# Patient Record
Sex: FEMALE | Race: OTHER | NOT HISPANIC OR LATINO | Employment: STUDENT | URBAN - METROPOLITAN AREA
[De-identification: names, ages, dates, MRNs, and addresses within clinical notes are randomized per-mention and may not be internally consistent; named-entity substitution may affect disease eponyms.]

---

## 2023-09-30 ENCOUNTER — PHARMACY VISIT (OUTPATIENT)
Dept: PHARMACY | Facility: CLINIC | Age: 23
End: 2023-09-30
Payer: COMMERCIAL

## 2023-09-30 PROCEDURE — RXMED WILLOW AMBULATORY MEDICATION CHARGE

## 2023-10-02 ENCOUNTER — PHARMACY VISIT (OUTPATIENT)
Dept: PHARMACY | Facility: CLINIC | Age: 23
End: 2023-10-02
Payer: COMMERCIAL

## 2023-10-02 PROCEDURE — RXMED WILLOW AMBULATORY MEDICATION CHARGE

## 2023-10-02 RX ORDER — DOXYCYCLINE HYCLATE 100 MG
TABLET ORAL
Qty: 20 TABLET | Refills: 0 | OUTPATIENT
Start: 2023-10-02 | End: 2023-11-07 | Stop reason: SDUPTHER

## 2023-10-05 ENCOUNTER — PHARMACY VISIT (OUTPATIENT)
Dept: PHARMACY | Facility: CLINIC | Age: 23
End: 2023-10-05
Payer: COMMERCIAL

## 2023-10-05 PROCEDURE — RXMED WILLOW AMBULATORY MEDICATION CHARGE

## 2023-10-05 RX ORDER — FLUCONAZOLE 150 MG/1
TABLET ORAL
Qty: 1 TABLET | Refills: 2 | OUTPATIENT
Start: 2023-10-05 | End: 2023-11-30 | Stop reason: ALTCHOICE

## 2023-11-07 ENCOUNTER — PHARMACY VISIT (OUTPATIENT)
Dept: PHARMACY | Facility: CLINIC | Age: 23
End: 2023-11-07
Payer: COMMERCIAL

## 2023-11-07 PROCEDURE — RXMED WILLOW AMBULATORY MEDICATION CHARGE

## 2023-11-07 RX ORDER — DOXYCYCLINE HYCLATE 100 MG
TABLET ORAL
Qty: 14 TABLET | Refills: 0 | OUTPATIENT
Start: 2023-11-07 | End: 2023-11-30 | Stop reason: ALTCHOICE

## 2023-11-30 ENCOUNTER — OFFICE VISIT (OUTPATIENT)
Dept: OTOLARYNGOLOGY | Facility: HOSPITAL | Age: 23
End: 2023-11-30
Payer: COMMERCIAL

## 2023-11-30 ENCOUNTER — PHARMACY VISIT (OUTPATIENT)
Dept: PHARMACY | Facility: CLINIC | Age: 23
End: 2023-11-30
Payer: COMMERCIAL

## 2023-11-30 VITALS — WEIGHT: 104.6 LBS | HEIGHT: 66 IN | TEMPERATURE: 97.2 F | BODY MASS INDEX: 16.81 KG/M2

## 2023-11-30 DIAGNOSIS — R09.81 NASAL CONGESTION: ICD-10-CM

## 2023-11-30 PROCEDURE — RXMED WILLOW AMBULATORY MEDICATION CHARGE

## 2023-11-30 PROCEDURE — 99213 OFFICE O/P EST LOW 20 MIN: CPT | Performed by: STUDENT IN AN ORGANIZED HEALTH CARE EDUCATION/TRAINING PROGRAM

## 2023-11-30 PROCEDURE — 1036F TOBACCO NON-USER: CPT | Performed by: STUDENT IN AN ORGANIZED HEALTH CARE EDUCATION/TRAINING PROGRAM

## 2023-11-30 PROCEDURE — 99203 OFFICE O/P NEW LOW 30 MIN: CPT | Performed by: STUDENT IN AN ORGANIZED HEALTH CARE EDUCATION/TRAINING PROGRAM

## 2023-11-30 RX ORDER — FLUTICASONE PROPIONATE 50 MCG
2 SPRAY, SUSPENSION (ML) NASAL NIGHTLY
Qty: 16 G | Refills: 11 | Status: SHIPPED | OUTPATIENT
Start: 2023-11-30 | End: 2024-11-29

## 2023-11-30 ASSESSMENT — PATIENT HEALTH QUESTIONNAIRE - PHQ9
2. FEELING DOWN, DEPRESSED OR HOPELESS: NOT AT ALL
1. LITTLE INTEREST OR PLEASURE IN DOING THINGS: NOT AT ALL
SUM OF ALL RESPONSES TO PHQ9 QUESTIONS 1 & 2: 0

## 2023-11-30 NOTE — PROGRESS NOTES
Chief Complaint:    Chief Complaint   Patient presents with    Follow-up     Chronic sinus infections. After Covid - tonsil swollen, recurrent right ear infections, coughing and running nose. RSV treated follow bronchitis.       History of Present Illness:  23 y.o. female presents to [facility] on 11/30/23 for evaluation of her tonsils and sinuses.  The patient is a [employer] student, but is she is originally from [place].  She reports multiple episodes of ear infections, sinus infections, and tonsil infections ever since she came to college.  She has had 2 bad infections in the last several years.  1 which required hospitalization at [place] for what sounds like a respiratory infection.  The patient recently got COVID in September leading to tonsil issues and subsequent lung infection.  She has been on multiple courses of antibiotics.  She was told to follow-up with immunology as well as ENT as an outpatient.  She denies a previous history of frequent strep throat or tonsil infections prior to this.  She does note current congestion issues but no history of frequent sinusitis.  She denies history of strep throat infections.  The patient denies smoking and alcohol use.    Past Medical History  No past medical history on file.    Past Surgical History  No past surgical history on file.    Social History  Social History     Socioeconomic History    Marital status: Single     Spouse name: Not on file    Number of children: Not on file    Years of education: Not on file    Highest education level: Not on file   Occupational History    Not on file   Tobacco Use    Smoking status: Never    Smokeless tobacco: Never   Substance and Sexual Activity    Alcohol use: Not on file    Drug use: Not on file    Sexual activity: Not on file   Other Topics Concern    Not on file   Social History Narrative    Not on file     Social Determinants of Health     Financial Resource  "Strain: Not on file   Food Insecurity: Not on file   Transportation Needs: Not on file   Physical Activity: Not on file   Stress: Not on file   Social Connections: Not on file   Intimate Partner Violence: Not on file   Housing Stability: Not on file       Family History  No family history on file.    Medications:  Current Outpatient Medications   Medication Sig Dispense Refill    albuterol 90 mcg/actuation inhaler INHALE 2 PUFFS BY MOUTH EVERY 4 HOURS AS NEEDED. (Patient not taking: Reported on 11/30/2023) 8.5 g 0    fluticasone (Flonase) 50 mcg/actuation nasal spray Administer 2 sprays into each nostril once daily at bedtime. Shake gently. Before first use, prime pump. After use, clean tip and replace cap. 16 g 11    norethindrone ac-eth estradioL (Microgestin 1/20) 1-20 mg-mcg tablet TAKE 1 TABLET BY MOUTH ONCE DAILY (Patient not taking: Reported on 11/30/2023) 140 tablet 0     No current facility-administered medications for this visit.       Allergies: Cleocin [clindamycin phosphate]    Immunizations:   Immunization History   Administered Date(s) Administered    Pfizer Purple Cap SARS-CoV-2 03/18/2021, 04/10/2021, 10/12/2021      Physical Exam  Vital Signs:  Temp 36.2 °C (97.2 °F)   Ht 1.676 m (5' 6\")   Wt 47.4 kg (104 lb 9.6 oz)   BMI 16.88 kg/m²     General:  Well-developed, well-nourished. No distress  Communication and Voice:  Clear pitch and clarity  Respiratory  Respiratory effort:  Equal inspiration and expiration without stridor  Cardiovascular  Peripheral Vascular:  Warm extremities with equal pulses  Neuro: Patient oriented to person, place, and time;  Appropriate mood and affect;  Gait is intact with no imbalance; Cranial nerves II-XII are intact  Head and Face  Inspection:  Normocephalic and atraumatic without mass or lesion  Palpation:  Facial skeleton intact without bony stepoffs  Facial Strength:  Facial motility symmetric and full bilaterally  Eyes: PERRLA. No nystagmus with normal extraocular " motion bilaterally  ENT  Pinna:  External ear intact and fully developed  External canal:  Canal is patent with intact skin  Tympanic Membrane:  Clear and mobile.  There is no scarring or evidence of previous recurrent ear infections  External nose:  No scar or anatomic deformity  Internal Nose:  Septum intact and midline.  No edema, polyp, or rhinorrhea.  TMJ:  No pain to palpation with full mobility  Salivary Glands:  No mass or tenderness  Lips: No lesion.  Oral cavity: No mass or lesion.  Oropharynx:  No mass or lesion. Tonsillar 2+ no evidence of infection. Base of tongue soft without mass or induration  Neck  Trachea:  Midline trachea.  Thyroid:  No mass or nodularity  Lymphatics:  No lymphadenopathy    Impression/Plan:  23 y.o. female presents to [facility] on 11/30/23 for evaluation of her tonsils and sinuses.  I do not see anything particularly concerning on my exam today.  It sounds like she has had at least 2 bad infections in the last several years, 1 of which was related to COVID.    -I do not think that she would benefit from a tonsillectomy or any surgical interventions at this time.  -I would like to start her on Flonase for her nasal congestion issues  -The patient is scheduled to see immunology in the next several months  -I instructed the patient to call my office if she develops or feels like she is developing another infection

## 2024-03-06 ENCOUNTER — CONSULT (OUTPATIENT)
Dept: ALLERGY | Facility: HOSPITAL | Age: 24
End: 2024-03-06
Payer: COMMERCIAL

## 2024-03-06 ENCOUNTER — LAB (OUTPATIENT)
Dept: LAB | Facility: LAB | Age: 24
End: 2024-03-06
Payer: COMMERCIAL

## 2024-03-06 VITALS
OXYGEN SATURATION: 97 % | SYSTOLIC BLOOD PRESSURE: 112 MMHG | HEIGHT: 66 IN | TEMPERATURE: 98.3 F | WEIGHT: 99.87 LBS | HEART RATE: 83 BPM | RESPIRATION RATE: 16 BRPM | BODY MASS INDEX: 16.05 KG/M2 | DIASTOLIC BLOOD PRESSURE: 78 MMHG

## 2024-03-06 DIAGNOSIS — B99.9 RECURRENT INFECTIONS: Primary | ICD-10-CM

## 2024-03-06 DIAGNOSIS — B99.9 RECURRENT INFECTIONS: ICD-10-CM

## 2024-03-06 DIAGNOSIS — J31.0 CHRONIC RHINITIS: ICD-10-CM

## 2024-03-06 LAB
BASOPHILS # BLD AUTO: 0.04 X10*3/UL (ref 0–0.1)
BASOPHILS NFR BLD AUTO: 0.6 %
EOSINOPHIL # BLD AUTO: 0.16 X10*3/UL (ref 0–0.7)
EOSINOPHIL NFR BLD AUTO: 2.3 %
ERYTHROCYTE [DISTWIDTH] IN BLOOD BY AUTOMATED COUNT: 12.5 % (ref 11.5–14.5)
HCT VFR BLD AUTO: 37.8 % (ref 36–46)
HGB BLD-MCNC: 12.3 G/DL (ref 12–16)
IGA SERPL-MCNC: 191 MG/DL (ref 70–400)
IGG SERPL-MCNC: 1450 MG/DL (ref 700–1600)
IGM SERPL-MCNC: 147 MG/DL (ref 40–230)
IMM GRANULOCYTES # BLD AUTO: 0.01 X10*3/UL (ref 0–0.7)
IMM GRANULOCYTES NFR BLD AUTO: 0.1 % (ref 0–0.9)
LYMPHOCYTES # BLD AUTO: 2.35 X10*3/UL (ref 1.2–4.8)
LYMPHOCYTES NFR BLD AUTO: 33.7 %
MCH RBC QN AUTO: 28.1 PG (ref 26–34)
MCHC RBC AUTO-ENTMCNC: 32.5 G/DL (ref 32–36)
MCV RBC AUTO: 86 FL (ref 80–100)
MONOCYTES # BLD AUTO: 0.51 X10*3/UL (ref 0.1–1)
MONOCYTES NFR BLD AUTO: 7.3 %
NEUTROPHILS # BLD AUTO: 3.91 X10*3/UL (ref 1.2–7.7)
NEUTROPHILS NFR BLD AUTO: 56 %
NRBC BLD-RTO: 0 /100 WBCS (ref 0–0)
PLATELET # BLD AUTO: 250 X10*3/UL (ref 150–450)
RBC # BLD AUTO: 4.38 X10*6/UL (ref 4–5.2)
WBC # BLD AUTO: 7 X10*3/UL (ref 4.4–11.3)

## 2024-03-06 PROCEDURE — 88184 FLOWCYTOMETRY/ TC 1 MARKER: CPT

## 2024-03-06 PROCEDURE — 86317 IMMUNOASSAY INFECTIOUS AGENT: CPT

## 2024-03-06 PROCEDURE — 99205 OFFICE O/P NEW HI 60 MIN: CPT | Performed by: ALLERGY & IMMUNOLOGY

## 2024-03-06 PROCEDURE — 82785 ASSAY OF IGE: CPT

## 2024-03-06 PROCEDURE — 88187 FLOWCYTOMETRY/READ 2-8: CPT | Performed by: ALLERGY & IMMUNOLOGY

## 2024-03-06 PROCEDURE — 88185 FLOWCYTOMETRY/TC ADD-ON: CPT

## 2024-03-06 PROCEDURE — 86162 COMPLEMENT TOTAL (CH50): CPT

## 2024-03-06 PROCEDURE — 86003 ALLG SPEC IGE CRUDE XTRC EA: CPT

## 2024-03-06 PROCEDURE — 85025 COMPLETE CBC W/AUTO DIFF WBC: CPT

## 2024-03-06 PROCEDURE — 36415 COLL VENOUS BLD VENIPUNCTURE: CPT

## 2024-03-06 PROCEDURE — 99215 OFFICE O/P EST HI 40 MIN: CPT | Performed by: ALLERGY & IMMUNOLOGY

## 2024-03-06 PROCEDURE — 82784 ASSAY IGA/IGD/IGG/IGM EACH: CPT

## 2024-03-06 RX ORDER — TRETINOIN 0.1 MG/G
GEL TOPICAL
COMMUNITY
Start: 2020-12-14

## 2024-03-06 ASSESSMENT — ENCOUNTER SYMPTOMS
CARDIOVASCULAR NEGATIVE: 1
HEMATOLOGIC/LYMPHATIC NEGATIVE: 1
GASTROINTESTINAL NEGATIVE: 1
RHINORRHEA: 1
CONSTITUTIONAL NEGATIVE: 1
EYES NEGATIVE: 1
ALLERGIC/IMMUNOLOGIC NEGATIVE: 1
MUSCULOSKELETAL NEGATIVE: 1
RESPIRATORY NEGATIVE: 1

## 2024-03-06 NOTE — LETTER
March 6, 2024     Jackie Reynaga MD PhD  [id number]  Department Of Pediatrics-Pulmonary  [facility] [address]    Patient: Audi Santiago   YOB: 2000   Date of Visit: 3/6/2024       Dear Dr. Jackie Reynaga MD PhD:    Thank you for referring Audi Santiago to me for evaluation. Below are my notes for this consultation.  If you have questions, please do not hesitate to call me. I look forward to following your patient along with you.       Sincerely,     Princess HERNAN Porras MD      CC: No Recipients  ______________________________________________________________________________________    Audi Santiago presents for initial evaluation today.      Audi Santiago was seen at the request of Jackie Reynaga * for a chief complaint of recurrent infections; a report with my findings is being sent via written or electronic means to Jackie Reynaga, *   with my recommendations for treatment    She provides the following history:  She states that for the past 8-9 months she has had several infections.  She states that she in July 2023 she was admitted to [facility] in [place] for sepsis after a dental frenectomy.  She was hospitalized for 6 days.  She reports that the blood culture was drawn after she received antibiotics so the organism was not known.  She was discharged on Bactrim for 2 weeks.  She reports that she recovered then came to [place] at [facility] and got covid in September, then had persistent bronchitis.  She was treated with doxycycline.  She then got pneumonia (clinically diagnosed, no imaging performed) and was treated with another round of antibiotics.   She saw ENT around that time for enlarged tonsils.  She notes that she then developed RSV at the end of October 2023.  Since mid-December, has been feeling better, but feels she may have a mild URI for the past 2 weeks.     In terms of prior infections, she reports pneumonia in October 2021 diagnosed by CXR.   "She had bronchitis once at age 8.  She had recurrent ear infections as a child. Denies skin abscesses or boils.  Denies fungal infections other vaginal yeast infections.  She has had 3 confirmed covid infections.  She reports a strep throat infection in March 2023.   Denies prior sepsis or meningitis infections.      She denies personal or family history of immune deficiency or autoimmunity.     Rhinitis:  Reports itchy eyes and sore throat with weather changes    Asthma:  She has never been diagnosed with asthma, but recently diagnosed by Dr. Reynaga.  She has been prescribed Albuterol prn (once in Nov. 2021 and again in Nov. 2023).  She last used in December.     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    HENT:  Positive for rhinorrhea.    Eyes: Negative.    Respiratory: Negative.     Cardiovascular: Negative.    Gastrointestinal: Negative.    Musculoskeletal: Negative.    Skin: Negative.    Allergic/Immunologic: Negative.    Hematological: Negative.        Vital signs:  /78 (BP Location: Right arm, Patient Position: Sitting)   Pulse 83   Temp 36.8 °C (98.3 °F) (Oral)   Resp 16   Ht 1.676 m (5' 5.98\")   Wt 45.3 kg (99 lb 13.9 oz)   SpO2 97%   BMI 16.13 kg/m²     Physical Exam:  GENERAL: Alert, oriented and in no acute distress.     HEENT: EYES: No conjunctival injection or cobblestoning. Nose: nasal turbinates mildly edematous and are not boggy.  There is no mucous stranding, " polyps, or blood    noted. EARS: Tympanic membranes are clear. MOUTH: moist and pink with no exudates, ulcers, or thrush. NECK: is supple, without adenopathy.  No upper airway stridor noted.       HEART: regular rate and rhythm.       LUNGS: Clear to auscultation bilaterally. No wheezing, rhonchi or rales.        ABDOMEN: Positive bowel sounds, soft, nontender, nondistended.       EXTREMITIES: No clubbing or edema.        NEURO:  Normal affect.  Gait normal.      SKIN: No rash, hives, or angioedema noted      Impression:  1. Recurrent infections    2. Chronic rhinitis      Assessment and Plan:  Recurrent infections:  We discussed evaluation for immune deficiency.  We will check quantitative immunoglobulins (IgG, IgA, IgM, IgE), and qualitative humoral vaccine responses to tetanus, diphtheria, pneumococcus.  We discussed that if vaccine responses are inadequate, will recommend immunization, and repeat assessment of titers in 4-6 weeks.  We will also check baseline total complement levels, and assess T, B, NK cell subsets by lymphocyte immunophenotyping, as well as lymphocyte proliferation studies to antigens and mitogens.  We will discuss next steps pending results of testing.    Chronic rhinitis:  We will further evaluate with environmental aeroallergens specific IgE panel.  Recommend that she start Flonase.  We reviewed proper nasal spray administration technique.     Plan for follow up in 3 months or sooner if needed

## 2024-03-06 NOTE — PATIENT INSTRUCTIONS
It was nice to meet you today    Please have labs drawn. Please note that some labs will come back sooner than others.  We will contact you when all labs have returned so that we can discuss results.     You may use Flonase 2 sprays each nostril once daily    Technique for nasal spray administration:  First, look slightly down, breathe normally, you do not need to sniff while you spray.  To use the nose spray, place the tip in your nose with the opposite hand (left hand, right side of nose, right hand, left side of nose), and aim or point toward the outside of the nose.  Do not sniff or snort medication afterwards as this can cause most of the medication to be swallowed.  Rather, dab your nose with a tissue if any runs out.    We would like to see you in follow up in 3 months or sooner if needed

## 2024-03-06 NOTE — PROGRESS NOTES
Audi Santiago presents for initial evaluation today.      Audi Santiago was seen at the request of Jackie Reynaga * for a chief complaint of recurrent infections; a report with my findings is being sent via written or electronic means to Jackie Reynaga *   with my recommendations for treatment    She provides the following history:  She states that for the past 8-9 months she has had several infections.  She states that she in July 2023 she was admitted to [facility] in [place] for pyelonephritis and sepsis after a dental frenectomy.  She was hospitalized for 6 days.  She reports that the blood culture was drawn after she received antibiotics so the organism was not known.  She was discharged on Bactrim for 2 weeks.  She reports that she recovered then came to [place] at [facility] and got covid in September, then had persistent bronchitis.  She was treated with doxycycline.  She then got pneumonia (clinically diagnosed, no imaging performed) and was treated with another round of antibiotics.   She saw ENT around that time for enlarged tonsils.  She notes that she then developed RSV at the end of October 2023.  Since mid-December, has been feeling better, but feels she may have a mild URI for the past 2 weeks.     In terms of prior infections, she reports pneumonia in October 2021 diagnosed by CXR.  She had bronchitis once at age 8.  She had recurrent ear infections as a child. Denies skin abscesses or boils.  Denies fungal infections other vaginal yeast infections.  She has had 3 confirmed covid infections.  She reports a strep throat infection in March 2023.   Denies prior sepsis or meningitis infections.      She denies personal or family history of immune deficiency or autoimmunity.     Rhinitis:  Reports itchy eyes and sore throat with weather changes    Asthma:  She has never been diagnosed with asthma, but recently diagnosed by Dr. Reynaga.  She has been prescribed Albuterol prn  "(once in Nov. 2021 and again in Nov. 2023).  She last used in December.     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    HENT:  Positive for rhinorrhea.    Eyes: Negative.    Respiratory: Negative.     Cardiovascular: Negative.    Gastrointestinal: Negative.    Musculoskeletal: Negative.    Skin: Negative.    Allergic/Immunologic: Negative.    Hematological: Negative.        Vital signs:  /78 (BP Location: Right arm, Patient Position: Sitting)   Pulse 83   Temp 36.8 °C (98.3 °F) (Oral)   Resp 16   Ht 1.676 m (5' 5.98\")   Wt 45.3 kg (99 lb 13.9 oz)   SpO2 97%   BMI 16.13 kg/m²     Physical Exam:  GENERAL: Alert, oriented and in no acute distress.     HEENT: EYES: No conjunctival injection or cobblestoning. Nose: nasal turbinates mildly edematous and are not boggy.  There is no mucous stranding, polyps, or blood    noted. EARS: Tympanic membranes are clear. MOUTH: moist and pink with no exudates, ulcers, or thrush. NECK: is supple, without adenopathy.  No upper airway stridor noted.       HEART: regular rate and rhythm.       LUNGS: Clear to auscultation bilaterally. No wheezing, rhonchi or rales.        ABDOMEN: Positive bowel sounds, soft, nontender, nondistended.       EXTREMITIES: No clubbing or edema.        NEURO:  Normal affect.  Gait normal.      SKIN: No rash, hives, or angioedema noted      Impression:  1. Recurrent infections    2. Chronic rhinitis      Assessment and " Plan:  Recurrent infections:  We discussed evaluation for immune deficiency.  We will check quantitative immunoglobulins (IgG, IgA, IgM, IgE), and qualitative humoral vaccine responses to tetanus, diphtheria, pneumococcus.  We discussed that if vaccine responses are inadequate, will recommend immunization, and repeat assessment of titers in 4-6 weeks.  We will also check baseline total complement levels, and assess T, B, NK cell subsets by lymphocyte immunophenotyping, as well as lymphocyte proliferation studies to antigens and mitogens.  We will discuss next steps pending results of testing.    Chronic rhinitis:  We will further evaluate with environmental aeroallergens specific IgE panel.  Recommend that she start Flonase.  We reviewed proper nasal spray administration technique.     Plan for follow up in 3 months or sooner if needed

## 2024-03-06 NOTE — LETTER
March 6, 2024     Jackie Reynaga MD PhD  [id number]  Department Of Pediatrics-Pulmonary  [facility] [id number]    Patient: Audi Santiago   YOB: 2000   Date of Visit: 3/6/2024       Dear Dr. Jackie Reynaga MD PhD:    Thank you for referring Audi Santiago to me for evaluation. Below are my notes for this consultation.  If you have questions, please do not hesitate to call me. I look forward to following your patient along with you.       Sincerely,     Princess HERNAN Porras MD      CC: No Recipients  ______________________________________________________________________________________    Audi Santiago presents for initial evaluation today.      Audi Santiago was seen at the request of Jackie Reynaga * for a chief complaint of recurrent infections; a report with my findings is being sent via written or electronic means to Jackie Reynaga, *   with my recommendations for treatment    She provides the following history:  She states that for the past 8-9 months she has had several infections.  She states that she in July 2023 she was admitted to [facility] in [place] for pyelonephritis and sepsis after a dental frenectomy.  She was hospitalized for 6 days.  She reports that the blood culture was drawn after she received antibiotics so the organism was not known.  She was discharged on Bactrim for 2 weeks.  She reports that she recovered then came to [place] at [facility] and got covid in September, then had persistent bronchitis.  She was treated with doxycycline.  She then got pneumonia (clinically diagnosed, no imaging performed) and was treated with another round of antibiotics.   She saw ENT around that time for enlarged tonsils.  She notes that she then developed RSV at the end of October 2023.  Since mid-December, has been feeling better, but feels she may have a mild URI for the past 2 weeks.     In terms of prior infections, she reports pneumonia in October 2021  "diagnosed by CXR.  She had bronchitis once at age 8.  She had recurrent ear infections as a child. Denies skin abscesses or boils.  Denies fungal infections other vaginal yeast infections.  She has had 3 confirmed covid infections.  She reports a strep throat infection in March 2023.   Denies prior sepsis or meningitis infections.      She denies personal or family history of immune deficiency or autoimmunity.     Rhinitis:  Reports itchy eyes and sore throat with weather changes    Asthma:  She has never been diagnosed with asthma, but recently diagnosed by Dr. Reynaga.  She has been prescribed Albuterol prn (once in Nov. 2021 and again in Nov. 2023).  She last used in December.     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    HENT:  Positive for rhinorrhea.    Eyes: Negative.    Respiratory: Negative.     Cardiovascular: Negative.    Gastrointestinal: Negative.    Musculoskeletal: Negative.    Skin: Negative.    Allergic/Immunologic: Negative.    Hematological: Negative.        Vital signs:  /78 (BP Location: Right arm, Patient Position: Sitting)   Pulse 83   Temp 36.8 °C (98.3 °F) (Oral)   Resp 16   Ht 1.676 m (5' 5.98\")   Wt 45.3 kg (99 lb 13.9 oz)   SpO2 97%   BMI 16.13 kg/m²     Physical Exam:  GENERAL: Alert, oriented and in no acute distress.     HEENT: EYES: No conjunctival injection or cobblestoning. Nose: nasal turbinates mildly edematous and are not boggy.  There is no " mucous stranding, polyps, or blood    noted. EARS: Tympanic membranes are clear. MOUTH: moist and pink with no exudates, ulcers, or thrush. NECK: is supple, without adenopathy.  No upper airway stridor noted.       HEART: regular rate and rhythm.       LUNGS: Clear to auscultation bilaterally. No wheezing, rhonchi or rales.        ABDOMEN: Positive bowel sounds, soft, nontender, nondistended.       EXTREMITIES: No clubbing or edema.        NEURO:  Normal affect.  Gait normal.      SKIN: No rash, hives, or angioedema noted      Impression:  1. Recurrent infections    2. Chronic rhinitis      Assessment and Plan:  Recurrent infections:  We discussed evaluation for immune deficiency.  We will check quantitative immunoglobulins (IgG, IgA, IgM, IgE), and qualitative humoral vaccine responses to tetanus, diphtheria, pneumococcus.  We discussed that if vaccine responses are inadequate, will recommend immunization, and repeat assessment of titers in 4-6 weeks.  We will also check baseline total complement levels, and assess T, B, NK cell subsets by lymphocyte immunophenotyping, as well as lymphocyte proliferation studies to antigens and mitogens.  We will discuss next steps pending results of testing.    Chronic rhinitis:  We will further evaluate with environmental aeroallergens specific IgE panel.  Recommend that she start Flonase.  We reviewed proper nasal spray administration technique.     Plan for follow up in 3 months or sooner if needed

## 2024-03-07 LAB

## 2024-03-08 ENCOUNTER — TELEPHONE (OUTPATIENT)
Dept: ALLERGY | Facility: CLINIC | Age: 24
End: 2024-03-08
Payer: COMMERCIAL

## 2024-03-08 DIAGNOSIS — B99.9 RECURRENT INFECTIONS: Primary | ICD-10-CM

## 2024-03-08 LAB
C DIPHTHERIAE IGG SER-ACNC: 0.3 IU/ML
C TETANI TOXOID IGG SERPL IA-ACNC: 2.3 IU/ML
CD19 CELLS # BLD: 0.26 X10E9/L
CD19 CELLS NFR BLD: 11 %
CD3 CELLS # BLD: 1.88 X10E9/L
CD3 CELLS NFR SPEC: 80 %
CD3+CD4+ CELLS # BLD: 1.2 X10E9/L
CD3+CD4+ CELLS # BLD: 1199 /MM3
CD3+CD4+ CELLS NFR BLD: 51 %
CD3+CD4+ CELLS/CD3+CD8+ CLL BLD: 2.04 %
CD3+CD4-CD8-CD45+ CELLS NFR BLD: 5 %
CD3+CD8+ CELLS # BLD: 0.59 X10E9/L
CD3+CD8+ CELLS NFR BLD: 25 %
CD3-CD16+CD56+ CELLS # BLD: 0.21 X10E9/L
CD3-CD16+CD56+ CELLS NFR BLD: 9 %
CH50 SERPL-ACNC: 66.2 U/ML (ref 38.7–89.9)
FLOW CYTOMETRY SPECIALIST REVIEW: NORMAL
LYMPHOCYTES # SPEC AUTO: 2.35 X10*3/UL
PATH REVIEW, IMMUNODEFICIENCY PROFILE: NORMAL
S PN DA SERO 19F IGG SER-MCNC: 4.36 UG/ML
S PNEUM DA 1 IGG SER-MCNC: 1.68 UG/ML
S PNEUM DA 10A IGG SER-MCNC: 0.36 UG/ML
S PNEUM DA 11A IGG SER-MCNC: 0.32 UG/ML
S PNEUM DA 12F IGG SER-MCNC: 0.25 UG/ML
S PNEUM DA 14 IGG SER-MCNC: 1.99 UG/ML
S PNEUM DA 15B IGG SER-MCNC: 0.48 UG/ML
S PNEUM DA 17F IGG SER-MCNC: 1.28 UG/ML
S PNEUM DA 18C IGG SER-MCNC: 8.06 UG/ML
S PNEUM DA 19A IGG SER-MCNC: 1.3 UG/ML
S PNEUM DA 2 IGG SER-MCNC: 0.89 UG/ML
S PNEUM DA 20A IGG SER-MCNC: 0.8 UG/ML
S PNEUM DA 22F IGG SER-MCNC: 0.19 UG/ML
S PNEUM DA 23F IGG SER-MCNC: 0.62 UG/ML
S PNEUM DA 3 IGG SER-MCNC: 0.77 UG/ML
S PNEUM DA 33F IGG SER-MCNC: 0.24 UG/ML
S PNEUM DA 4 IGG SER-MCNC: 0.45 UG/ML
S PNEUM DA 5 IGG SER-MCNC: 0.66 UG/ML
S PNEUM DA 6B IGG SER-MCNC: 2.02 UG/ML
S PNEUM DA 7F IGG SER-MCNC: 0.08 UG/ML
S PNEUM DA 8 IGG SER-MCNC: 0.46 UG/ML
S PNEUM DA 9N IGG SER-MCNC: 0.58 UG/ML
S PNEUM DA 9V IGG SER-MCNC: 0.3 UG/ML
S PNEUM SEROTYPE IGG SER-IMP: NORMAL

## 2024-03-08 NOTE — TELEPHONE ENCOUNTER
Lab called to report lymphocyte proliferation to mitogens cancelled due to lab collected in wrong container, lymphocyte proliferation to antigens cancelled - no reason given.

## 2024-03-11 ENCOUNTER — TELEPHONE (OUTPATIENT)
Dept: ALLERGY | Facility: CLINIC | Age: 24
End: 2024-03-11
Payer: COMMERCIAL

## 2024-03-11 NOTE — TELEPHONE ENCOUNTER
Result Communication    Resulted Orders   Diphtheria Antibody   Result Value Ref Range    Diphtheria Antibody 0.3 IU/mL      Comment:      INTERPRETIVE INFORMATION: Diphtheria Ab, IgG    Antibody concentration of greater than 0.1 IU/mL is usually   considered protective.    Responder status is determined according to the ratio of a one   month post-vaccination sample to pre-vaccination concentrations of   Diphtheria IgG Abs as follows:     1. If the one month post-vaccination concentration is less     than 1.0 IU/mL, the patient is considered to be a     non-responder.    2. If the post-vaccination concentration is greater than or     equal to 1.0 IU/mL, a patient with a ratio of less than     1.5 is a non-responder, a ratio of 1.5 to less than 3.0,      a weak responder, and a ratio of 3.0 or greater, a good      responder.    3. If the pre-vaccination concentration is greater than     1.0 IU/mL, it may be difficult to assess the response     based on a ratio alone. A post-vaccination concentration     above 2.5 IU/mL in this case is usually adequate.     This test was developed and its performance characteristics    determined by [facility]. It has not been cleared or   approved by the [place] Food and Drug Administration. This test was   performed in a CLIA certified laboratory and is intended for   clinical purposes.  Performed By: [facility]  [address] [place] [id number]  : Iglesia Chamberlain MD, PhD  CLIA Number: [id number]   Tetanus Antibody, IgG   Result Value Ref Range    Tetanus Ab 2.3 IU/mL      Comment:      INTERPRETIVE INFORMATION: Tetanus Ab, IgG    Antibody concentration of greater than 0.1 IU/mL is usually   considered protective.    Responder status is determined according to the ratio of a   one-month post-vaccination sample to pre-vaccination concentration   of Tetanus IgG Abs as follows:     1. If the one month post-vaccination concentration is     less  than 1.0 IU/mL, the patient is considered a      non-responder.    2. If the post-vaccination concentration is greater than     or equal to 1.0 IU/mL, a patient with a ratio of less     than 1.5 is a non-responder, a ratio of 1.5 to less      than 3.0, a weak responder, and a ratio of 3.0 or     greater, a good responder.    3. If the pre-vaccination concentration is greater than     1.0 IU/mL, it may be difficult to assess the response     based on a ratio alone. A post-vaccination     concentration above 2.5 IU/mL in this case is usually     adequate.    This test was developed and its performance characteristics   determined by  [facility]. It has not been cleared or   approved by the US Food and Drug Administration. This test was   performed in a CLIA certified laboratory and is intended for   clinical purposes.  Performed By: [facility]  [address] [place] [address]  : Iglesia Chamberlain MD, PhD  CLIA Number: [id number]   Immunoglobulins (IgG, IgA, IgM)   Result Value Ref Range    IgG 1,450 700 - 1,600 mg/dL    IgA 191 70 - 400 mg/dL    IgM 147 40 - 230 mg/dL    Narrative    MONOCLONAL PROTEINS MAY CAUSE FALSELY LOW  RESULTS IN THIS ASSAY. SERUM PROTEIN  ELECTROPHORESIS SHOULD BE DONE AS THE  FIRST TEST TO EVALUATE MONOCLONAL GAMMOPATHY.   Complement, Total   Result Value Ref Range    Complement, Total (CH50) 66.2 38.7 - 89.9 U/mL      Comment:        Normal activity in total complement functional assay (CH50)   suggests normal presence and function of complement components,   C1-C9. However, normal CH50 result can also occur in the presence   of low levels of complement components due to excess presence of   complement proteins in human serum. If clinically indicated,   measurement of individual complement components is recommended.   Normal CH50 result with low complement alternate pathway   functional (AH50, test code [id number]) activity suggests defects in   the  alternate pathway.  REFERENCE INTERVAL: Complement Activity Total, (CH50)         38.6 U/mL or less ..........Low       38.7-89.9 U/mL .............Normal       90.0 U/mL or greater .......High  Performed By: [facility]  [address] [place] [id number]  : Iglesia Chamberlain MD, PhD  CLIA Number: [id number]   CBC and Auto Differential   Result Value Ref Range    WBC 7.0 4.4 - 11.3 x10*3/uL    nRBC 0.0 0.0 - 0.0 /100 WBCs    RBC 4.38 4.00 - 5.20 x10*6/uL    Hemoglobin 12.3 12.0 - 16.0 g/dL    Hematocrit 37.8 36.0 - 46.0 %    MCV 86 80 - 100 fL    MCH 28.1 26.0 - 34.0 pg    MCHC 32.5 32.0 - 36.0 g/dL    RDW 12.5 11.5 - 14.5 %    Platelets 250 150 - 450 x10*3/uL    Neutrophils % 56.0 40.0 - 80.0 %    Immature Granulocytes %, Automated 0.1 0.0 - 0.9 %      Comment:      Immature Granulocyte Count (IG) includes promyelocytes, myelocytes and metamyelocytes but does not include bands. Percent differential counts (%) should be interpreted in the context of the absolute cell counts (cells/UL).    Lymphocytes % 33.7 13.0 - 44.0 %    Monocytes % 7.3 2.0 - 10.0 %    Eosinophils % 2.3 0.0 - 6.0 %    Basophils % 0.6 0.0 - 2.0 %    Neutrophils Absolute 3.91 1.20 - 7.70 x10*3/uL      Comment:      Percent differential counts (%) should be interpreted in the context of the absolute cell counts (cells/uL).    Immature Granulocytes Absolute, Automated 0.01 0.00 - 0.70 x10*3/uL    Lymphocytes Absolute 2.35 1.20 - 4.80 x10*3/uL    Monocytes Absolute 0.51 0.10 - 1.00 x10*3/uL    Eosinophils Absolute 0.16 0.00 - 0.70 x10*3/uL    Basophils Absolute 0.04 0.00 - 0.10 x10*3/uL   Strep Pneumo IgG Ab 23 Serotypes   Result Value Ref Range    Serotype 1 1.68 ug/mL    Serotype 2 0.89 ug/mL    Serotype 3 0.77 ug/mL    Serotype 4 0.45 ug/mL    Serotype 5 0.66 ug/mL    Serotype 8 0.46 ug/mL    Serotype 9N 0.58 ug/mL    Serotype 12F 0.25 ug/mL    Serotype 14 1.99 ug/mL    Serotype 17F 1.28 ug/mL    Serotype 19F 4.36  ug/mL    Serotype 20 0.80 ug/mL    Serotype 22F 0.19 ug/mL    Serotype 23F 0.62 ug/mL    Serotype 6B(26) 2.02 ug/mL    Serotype 10A(34) 0.36 ug/mL    Serotype 11A(43) 0.32 ug/mL    Serotype 7F(51) 0.08 ug/mL    Serotype 15B(54) 0.48 ug/mL    Serotype 18C(56) 8.06 ug/mL    Serotype 19A(57) 1.30 ug/mL    Serotype 9V(68) 0.30 ug/mL    Serotype 33F(70) 0.24 ug/mL    Pneumo Serotype Interpretation See Note       Comment:      INTERPRETIVE INFORMATION: Streptococcus pneumoniae Antibodies, IgG    A pre- and postvaccination comparison is required to adequately   assess the humoral immune response to the pure polysaccharide   Pneumovax 23 (PNX) and/or the protein conjugated Prevnar 7 (P7),   Prevnar 13 (P13), Prevnar 20 (P20), and Vaxneuvance (V15)   Streptococcus pneumoniae vaccines. Prevaccination samples should   be collected prior to vaccine administration. Postvaccination   samples should be obtained at least 4 weeks after immunization.   Testing of postvaccination samples alone will provide only general   immune status of the individual to various pneumococcal serotypes.    In the case of pure polysaccharide vaccine, indication of immune   system competence is further delineated as an adequate response to   at least 50 percent of the serotypes in the vaccine challenge for   those 2-5 years of age and to at least 70 percent of the serotypes   in the vaccine challenge for those 6-65 years of age. Individual    immune response may vary based on age, past exposure,   immunocompetence, and pneumococcal serotype.    Responder Status           Antibody Ratio      Nonresponder ........... Less than twofold increase and                              postvaccination concentration                              less than 1.3 ug/mL      Good responder ......... At least a twofold increase                              and/or a postvaccination                              concentration greater than or                             equal  to 1.3 ug/mL    A response to 50-70 percent or more of the serotypes in the   vaccine challenge is considered a normal humoral response.(Leda,   2014) Antibody concentration greater than 1.0-1.3 ug/mL is   generally considered long-term protection.(Leda, 2015)    References:    1. Leda MASON, Vielka JW, Jp X, et al. Multilaboratory   assessment of threshold versus fold-change algorithms for   minimizing analytical variability in multiplexed pneumococcal IgG    measurements. Clin Vaccine Immunol. 2014;21(7):982-988.    2. Leda MASON, Cody ENGLISH. Use and clinical interpretation of   pneumococcal antibody measurements in the evaluation of humoral   immune function. Clin Vaccine Immunol. 2015;22(2):148-152.    This test was developed and its performance characteristics   determined by [facility]. It has not been cleared or   approved by the U.S. Food and Drug Administration. This test was   performed in a CLIA-certified laboratory and is intended for   clinical purposes.  Performed By: [facility]  [address] [place] [id number]  : Iglesia Chamberlain MD, PhD  CLIA Number: [id number]   Respiratory Allergy Profile IgE   Result Value Ref Range    Immunocap IgE 16.9 <=214 KU/L      Comment:      Note: Omalizumab (Xolair, Genentech; humanized  IgG1 antihuman IgE Fc) treatment does not  significantly interfere with the accuracy of  total IgE on the ImmunoCAP (True Pivot) platform.  J Allergy Clin Immunol 2006;117:759-66).  Allergens, parasitic diseases, smoking, and  alcohol consumption have been reported to  increase levels of total IgE in serum.    Bermuda Grass IgE <0.10 <0.10 kU/L    Aidan Grass IgE <0.10 <0.10 kU/L    [place] Grass, [place] Blue IgE <0.10 <0.10 kU/L    Kevin Grass IgE <0.10 <0.10 kU/L    Goosefoot, Lamb's Quarters IgE <0.10 <0.10 kU/L    Common Pigweed IgE <0.10 <0.10 kU/L    Common Ragweed IgE <0.10 <0.10 kU/L    White Devon IgE <0.10 <0.10 kU/L    Common Silver  Birch IgE <0.10 <0.10 kU/L    Box-Elder IgE <0.10 <0.10 kU/L    Mountain Juniper IgE <0.10 <0.10 kU/L    [place] IgE <0.10 <0.10 kU/L    Elm IgE <0.10 <0.10 kU/L    [place] IgE <0.10 <0.10 kU/L    Pecan, Hickory IgE <0.10 <0.10 kU/L    Maple Chino Naturita, [place] IgE <0.10 <0.10 kU/L    [place] Tree IgE <0.10 <0.10 kU/L    Russian Thistle IgE <0.10 <0.10 kU/L    Sheep Sorrel IgE <0.10 <0.10 kU/L    Cat Dander IgE <0.10 <0.10 kU/L    Dog Dander IgE 0.51 (Low) <0.10 kU/L    Alternaria Alternata IgE <0.10 <0.10 kU/L    Cladosporium Herbarum IgE <0.10 <0.10 kU/L    English Plantain IgE <0.10 <0.10 kU/L    Dust Mite (D. farinae) IgE <0.10 <0.10 kU/L    Dust Mite (D. pteronyssinus) IgE <0.10 <0.10 kU/L    Malay Cockroach IgE <0.10 <0.10 kU/L    Aspergillus Fumigatus IgE <0.10 <0.10 kU/L    Oak IgE <0.10 <0.10 kU/L    Penicillium Chrysogenum IgE <0.10 <0.10 kU/L    Narrative    Interpretation Scale  <0.10 kU/L - Class 0 Allergen: ABSENT OR UNDETECTABLE ALLERGEN SPECIFIC IgE  0.10-0.34 kU/L - Class 0/1 Allergen: EQUIVOCAL LEVEL OF ALLERGEN SPECIFIC IgE  0.35-0.69 kU/L - Class 1 Allergen: LOW LEVEL OF ALLERGEN SPECIFIC IgE  0.70-3.49 kU/L - Class 2 Allergen: MODERATE LEVEL OF ALLERGEN SPECIFIC IgE  3.50-17.49 kU/L - Class 3 Allergen: HIGH LEVEL OF ALLERGEN SPECIFIC IgE  17.50-49.99 kU/L - Class 4 Allergen: VERY HIGH LEVEL OF ALLERGEN SPECIFIC IgE  50..00 kU/L - Class 5 Allergen: ULTRA HIGH LEVEL OF ALLERGEN SPECIFIC IgE  >100.00 kU/L - Class 6 Allergen: EXTREMELY HIGH LEVEL OF ALLERGEN SPECIFIC IgE   Immunodeficiency Profile   Result Value Ref Range    Lymphocyte Absolute 2.35 not established x10*3/uL    CD3% 80 59 - 87 %    CD3 Absolute 1.880 0.710 - 4.180 x10E9/L    CD3+CD4+% 51 29 - 57 %    CD3+CD4+ Absolute 1.199 0.350 - 2.740 x10E9/L    CD3+CD8+% 25 7 - 31 %    CD3+CD8+ Absolute 0.588 0.080 - 1.490 x10E9/L    CD4/CD8 Ratio 2.04 1.00 - 2.60    CD3+CD4-CD8-% 5.00 0.00 - 6.00 %    CD3-CD16+CD56+% 9.0 0.0 -  18.0 %    CD3-CD16+CD56+ Absolute 0.212 0.000 - 0.860 x10E9/L    CD19% 11.0 6 - 19 %    CD19 Absolute 0.259 0.070 - 0.910 x10E9/L    Interpretation, Immunodeficiency Profile       Flow cytometric analysis of the patient's blood cells within the characteristic lymphocytic garcia revealed the presence of CD4+ and CD8+ T lymphocytes, B lymphocytes, and natural killer cells. There is no increase in double negative(CD4-CD8-)T lymphocytes.       Path Review, Immunodeficiency Profile       Electronically signed out by Luiz Luis MD PhD on 3/8/24 at 4:48 PM.  By the signature on this report, the individual or group listed as making the Final Interpretation/Diagnosis certifies that they have reviewed this case and the staining reactivity of the antibodies and reagents in the analysis were determined to be acceptable. Diagnostic interpretation performed at [facility]    CD3+CD4+ Absolute (/mm3) 1,199 350-2,740 /mm3    Narrative    This test is a multicolor, whole blood lysis assay. It was developed and its performance characteristics determined by the Department of Pathology, [facility], and has not been cleared or approved by the U.S. Food and Drug Administration. The laboratory is regulated under CLIA as qualified to perform high complexity testing. This test is used for clinical purposes. It should not be regarded as investigational or for research.    Immunophenotypic analysis was performed using the following antibodies: CD3, CD4, CD8, CD16/56, CD19, and CD45       11:51 AM      Results were successfully communicated with the patient and they acknowledged their understanding. Scheduled for pneumovax in office.

## 2024-03-11 NOTE — TELEPHONE ENCOUNTER
Please let patient know that immune labs are within normal limits except for non-protective pneumococcal titers.  Recommend [id number] (may receive in our office) and repeat titers in 4-6 weeks.  She will need to re-draw her lymphocyte proliferation tests this week (M-W prior to 11 am).  Her dog IgE testing is positive.

## 2024-10-25 ENCOUNTER — RX ONLY (OUTPATIENT)
Age: 24
Setting detail: RX ONLY
End: 2024-10-25

## 2024-10-25 RX ORDER — KETOCONAZOLE 20 MG/ML
SHAMPOO, SUSPENSION TOPICAL
Qty: 120 | Refills: 2 | Status: ERX | COMMUNITY
Start: 2024-10-25

## 2024-10-25 RX ORDER — TRETIONIN 0.5 MG/G
CREAM TOPICAL
Qty: 45 | Refills: 3 | Status: ERX | COMMUNITY
Start: 2024-10-25

## 2025-04-10 ENCOUNTER — RX ONLY (RX ONLY)
Age: 25
End: 2025-04-10

## 2025-04-10 RX ORDER — CERAMIDE 1,3,6-II/SALICYLIC/B3
CLEANSER (ML) TOPICAL
Qty: 453 | Refills: 11 | Status: ERX | COMMUNITY
Start: 2025-04-10

## 2025-05-10 ENCOUNTER — RX ONLY (RX ONLY)
Age: 25
End: 2025-05-10

## 2025-05-10 RX ORDER — AMMONIUM LACTATE 120 MG/G
CREAM TOPICAL
Qty: 385 | Refills: 4 | Status: ERX | COMMUNITY
Start: 2025-05-10

## 2025-05-10 RX ORDER — CERAMIDE 1,3,6-II/SALICYLIC/B3
CLEANSER (ML) TOPICAL
Qty: 355 | Refills: 5 | Status: ERX | COMMUNITY
Start: 2025-05-10

## 2025-05-10 RX ORDER — AZELAIC ACID 0.15 G/G
GEL TOPICAL
Qty: 50 | Refills: 11 | Status: ERX | COMMUNITY
Start: 2025-05-10

## 2025-05-14 ENCOUNTER — RX ONLY (RX ONLY)
Age: 25
End: 2025-05-14

## 2025-05-14 RX ORDER — CERAMIDE 1,3,6-II/SALICYLIC/B3
CLEANSER (ML) TOPICAL
Qty: 355 | Refills: 5 | Status: ERX

## 2025-05-14 RX ORDER — AZELAIC ACID 0.15 G/G
GEL TOPICAL
Qty: 50 | Refills: 11 | Status: ERX

## 2025-05-14 RX ORDER — MOMETASONE FUROATE 1 MG/ML
SOLUTION TOPICAL
Qty: 30 | Refills: 2 | Status: ERX | COMMUNITY
Start: 2025-05-14

## 2025-05-14 RX ORDER — CLASCOTERONE 1 G/100G
CREAM TOPICAL
Qty: 60 | Refills: 2 | Status: ERX | COMMUNITY
Start: 2025-05-14

## 2025-06-05 ENCOUNTER — APPOINTMENT (OUTPATIENT)
Dept: URBAN - METROPOLITAN AREA CLINIC 41 | Facility: CLINIC | Age: 25
Setting detail: DERMATOLOGY
End: 2025-06-05

## 2025-06-05 DIAGNOSIS — Z41.9 ENCOUNTER FOR PROCEDURE FOR PURPOSES OTHER THAN REMEDYING HEALTH STATE, UNSPECIFIED: ICD-10-CM

## 2025-06-05 PROCEDURE — ? BOTOX

## 2025-06-05 NOTE — PROCEDURE: BOTOX
Periorbital Skin Units: 0
Show Nasal Units: Yes
Consent: Written consent obtained. Risks include but not limited to lid/brow ptosis, bruising, swelling, diplopia, temporary effect, incomplete chemical denervation.
Post-Care Instructions: - Do not lie down for 4 hours after the procedure.\\n- Work the treated area by wrinkling or allan the muscles that were injected for 30 minutes. Avoid massaging and putting forced pressure on areas.\\n- Do not exercise for 24 hours.\\n- Avoid aspirin and aspirin containing products for 24 hours to prevent bruising.\\n- It will take 2 weeks to see full effects of the Botox injection. We recommend scheduling a 1 month follow-up appointment.
Show Ucl Units: No
Glabellar Complex Units: 16
Incrementing Botox Units: By 0.5 Units
Lot #: [id number]
Additional Area 2 Location: jelly rolls
Dilution (U/0.1 Cc): 10
Masseter Units: 40
Comments: Sample used
Detail Level: Zone
Additional Area 1 Location: lip flip

## 2025-06-13 ENCOUNTER — RX ONLY (RX ONLY)
Age: 25
End: 2025-06-13

## 2025-06-13 RX ORDER — AZELAIC ACID 0.15 G/G
GEL TOPICAL
Qty: 50 | Refills: 5 | Status: ERX

## 2025-06-25 ENCOUNTER — APPOINTMENT (OUTPATIENT)
Dept: URBAN - METROPOLITAN AREA CLINIC 41 | Facility: CLINIC | Age: 25
Setting detail: DERMATOLOGY
End: 2025-06-25

## 2025-06-25 DIAGNOSIS — L65.0 TELOGEN EFFLUVIUM: ICD-10-CM | Status: STABLE

## 2025-06-25 DIAGNOSIS — L70.0 ACNE VULGARIS: ICD-10-CM | Status: INADEQUATELY CONTROLLED

## 2025-06-25 PROCEDURE — ? URINE PREGNANCY TEST

## 2025-06-25 PROCEDURE — ? COUNSELING

## 2025-06-25 PROCEDURE — ? PRESCRIPTION MEDICATION MANAGEMENT

## 2025-06-25 PROCEDURE — ? MDM - TREATMENT GOALS

## 2025-06-25 PROCEDURE — ? ISOTRETINOIN INITIATION

## 2025-06-25 PROCEDURE — ? PRESCRIPTION

## 2025-06-25 PROCEDURE — ? HIGH RISK MEDICATION MONITORING

## 2025-06-25 PROCEDURE — ? TREATMENT REGIMEN

## 2025-06-25 PROCEDURE — ? ORDER TESTS

## 2025-06-25 RX ORDER — DOXYCYCLINE HYCLATE 100 MG/1
CAPSULE, GELATIN COATED ORAL BID
Qty: 120 | Refills: 0 | Status: ERX | COMMUNITY
Start: 2025-06-25

## 2025-06-25 RX ORDER — SPIRONOLACTONE 50 MG/1
TABLET, FILM COATED ORAL
Qty: 180 | Refills: 0 | Status: ERX | COMMUNITY
Start: 2025-06-25

## 2025-06-25 RX ADMIN — DOXYCYCLINE HYCLATE: 100 CAPSULE, GELATIN COATED ORAL at 00:00

## 2025-06-25 RX ADMIN — SPIRONOLACTONE: 50 TABLET, FILM COATED ORAL at 00:00

## 2025-06-25 ASSESSMENT — LOCATION SIMPLE DESCRIPTION DERM
LOCATION SIMPLE: ANTERIOR SCALP
LOCATION SIMPLE: RIGHT CHEEK
LOCATION SIMPLE: LEFT CHEEK

## 2025-06-25 ASSESSMENT — LOCATION DETAILED DESCRIPTION DERM
LOCATION DETAILED: LEFT MEDIAL MALAR CHEEK
LOCATION DETAILED: RIGHT INFERIOR CENTRAL MALAR CHEEK
LOCATION DETAILED: MID-FRONTAL SCALP

## 2025-06-25 ASSESSMENT — LOCATION ZONE DERM
LOCATION ZONE: FACE
LOCATION ZONE: SCALP

## 2025-06-25 NOTE — PROCEDURE: COUNSELING
Detail Level: Detailed
Minocycline Counseling: Patient advised regarding possible photosensitivity and discoloration of the teeth, skin, lips, tongue and gums.  Patient instructed to avoid sunlight, if possible.  When exposed to sunlight, patients should wear protective clothing, sunglasses, and sunscreen.  The patient was instructed to call the office immediately if the following severe adverse effects occur:  hearing changes, easy bruising/bleeding, severe headache, or vision changes.  The patient verbalized understanding of the proper use and possible adverse effects of minocycline.  All of the patient's questions and concerns were addressed.
Doxycycline Pregnancy And Lactation Text: This medication is Pregnancy Category D and not consider safe during pregnancy. It is also excreted in breast milk but is considered safe for shorter treatment courses.
Tazorac Counseling:  Patient advised that medication is irritating and drying.  Patient may need to apply sparingly and wash off after an hour before eventually leaving it on overnight.  The patient verbalized understanding of the proper use and possible adverse effects of tazorac.  All of the patient's questions and concerns were addressed.
Winlevi Pregnancy And Lactation Text: This medication is considered safe during pregnancy and breastfeeding.
Aklief Pregnancy And Lactation Text: It is unknown if this medication is safe to use during pregnancy.  It is unknown if this medication is excreted in breast milk.  Breastfeeding women should use the topical cream on the smallest area of the skin for the shortest time needed while breastfeeding.  Do not apply to nipple and areola.
Dapsone Pregnancy And Lactation Text: This medication is Pregnancy Category C and is not considered safe during pregnancy or breast feeding.
Include Pregnancy/Lactation Warning?: Add Automatically Based on Childbearing Potential and Patient Age
Topical Retinoid counseling:  Patient advised to apply a pea-sized amount only at bedtime and wait 30 minutes after washing their face before applying.  If too drying, patient may add a non-comedogenic moisturizer. The patient verbalized understanding of the proper use and possible adverse effects of retinoids.  All of the patient's questions and concerns were addressed.
Tetracycline Pregnancy And Lactation Text: This medication is Pregnancy Category D and not consider safe during pregnancy. It is also excreted in breast milk.
Birth Control Pills Pregnancy And Lactation Text: This medication should be avoided if pregnant and for the first 30 days post-partum.
Bactrim Pregnancy And Lactation Text: This medication is Pregnancy Category D and is known to cause fetal risk.  It is also excreted in breast milk.
High Dose Vitamin A Counseling: Side effects reviewed, pt to contact office should one occur.
Use Enhanced Medication Counseling?: No
Benzoyl Peroxide Counseling: Patient counseled that medicine may cause skin irritation and bleach clothing.  In the event of skin irritation, the patient was advised to reduce the amount of the drug applied or use it less frequently.   The patient verbalized understanding of the proper use and possible adverse effects of benzoyl peroxide.  All of the patient's questions and concerns were addressed.
Topical Clindamycin Pregnancy And Lactation Text: This medication is Pregnancy Category B and is considered safe during pregnancy. It is unknown if it is excreted in breast milk.
Spironolactone Pregnancy And Lactation Text: This medication can cause feminization of the male fetus and should be avoided during pregnancy. The active metabolite is also found in breast milk.
Isotretinoin Counseling: Patient should get monthly blood tests, not donate blood, not drive at night if vision affected, not share medication, and not undergo elective surgery for 6 months after tx completed. Side effects reviewed, pt to contact office should one occur.
Topical Sulfur Applications Pregnancy And Lactation Text: This medication is Pregnancy Category C and has an unknown safety profile during pregnancy. It is unknown if this topical medication is excreted in breast milk.
Azelaic Acid Counseling: Patient counseled that medicine may cause skin irritation and to avoid applying near the eyes.  In the event of skin irritation, the patient was advised to reduce the amount of the drug applied or use it less frequently.   The patient verbalized understanding of the proper use and possible adverse effects of azelaic acid.  All of the patient's questions and concerns were addressed.
Erythromycin Counseling:  I discussed with the patient the risks of erythromycin including but not limited to GI upset, allergic reaction, drug rash, diarrhea, increase in liver enzymes, and yeast infections.
Azithromycin Pregnancy And Lactation Text: This medication is considered safe during pregnancy and is also secreted in breast milk.
Doxycycline Counseling:  Patient counseled regarding possible photosensitivity and increased risk for sunburn.  Patient instructed to avoid sunlight, if possible.  When exposed to sunlight, patients should wear protective clothing, sunglasses, and sunscreen.  The patient was instructed to call the office immediately if the following severe adverse effects occur:  hearing changes, easy bruising/bleeding, severe headache, or vision changes.  The patient verbalized understanding of the proper use and possible adverse effects of doxycycline.  All of the patient's questions and concerns were addressed.
High Dose Vitamin A Pregnancy And Lactation Text: High dose vitamin A therapy is contraindicated during pregnancy and breast feeding.
Aklief counseling:  Patient advised to apply a pea-sized amount only at bedtime and wait 30 minutes after washing their face before applying.  If too drying, patient may add a non-comedogenic moisturizer.  The most commonly reported side effects including irritation, redness, scaling, dryness, stinging, burning, itching, and increased risk of sunburn.  The patient verbalized understanding of the proper use and possible adverse effects of retinoids.  All of the patient's questions and concerns were addressed.
Tazorac Pregnancy And Lactation Text: This medication is not safe during pregnancy. It is unknown if this medication is excreted in breast milk.
Winlevi Counseling:  I discussed with the patient the risks of topical clascoterone including but not limited to erythema, scaling, itching, and stinging. Patient voiced their understanding.
Dapsone Counseling: I discussed with the patient the risks of dapsone including but not limited to hemolytic anemia, agranulocytosis, rashes, methemoglobinemia, kidney failure, peripheral neuropathy, headaches, GI upset, and liver toxicity.  Patients who start dapsone require monitoring including baseline LFTs and weekly CBCs for the first month, then every month thereafter.  The patient verbalized understanding of the proper use and possible adverse effects of dapsone.  All of the patient's questions and concerns were addressed.
Topical Retinoid Pregnancy And Lactation Text: This medication is Pregnancy Category C. It is unknown if this medication is excreted in breast milk.
Birth Control Pills Counseling: Birth Control Pill Counseling: I discussed with the patient the potential side effects of OCPs including but not limited to increased risk of stroke, heart attack, thrombophlebitis, deep venous thrombosis, hepatic adenomas, breast changes, GI upset, headaches, and depression.  The patient verbalized understanding of the proper use and possible adverse effects of OCPs. All of the patient's questions and concerns were addressed.
Tetracycline Counseling: Patient counseled regarding possible photosensitivity and increased risk for sunburn.  Patient instructed to avoid sunlight, if possible.  When exposed to sunlight, patients should wear protective clothing, sunglasses, and sunscreen.  The patient was instructed to call the office immediately if the following severe adverse effects occur:  hearing changes, easy bruising/bleeding, severe headache, or vision changes.  The patient verbalized understanding of the proper use and possible adverse effects of tetracycline.  All of the patient's questions and concerns were addressed. Patient understands to avoid pregnancy while on therapy due to potential birth defects.
Spironolactone Counseling: Patient advised regarding risks of diarrhea, abdominal pain, hyperkalemia, birth defects (for female patients), liver toxicity and renal toxicity. The patient may need blood work to monitor liver and kidney function and potassium levels while on therapy. The patient verbalized understanding of the proper use and possible adverse effects of spironolactone.  All of the patient's questions and concerns were addressed.
Topical Sulfur Applications Counseling: Topical Sulfur Counseling: Patient counseled that this medication may cause skin irritation or allergic reactions.  In the event of skin irritation, the patient was advised to reduce the amount of the drug applied or use it less frequently.   The patient verbalized understanding of the proper use and possible adverse effects of topical sulfur application.  All of the patient's questions and concerns were addressed.
Isotretinoin Pregnancy And Lactation Text: This medication is Pregnancy Category X and is considered extremely dangerous during pregnancy. It is unknown if it is excreted in breast milk.
Benzoyl Peroxide Pregnancy And Lactation Text: This medication is Pregnancy Category C. It is unknown if benzoyl peroxide is excreted in breast milk.
Erythromycin Pregnancy And Lactation Text: This medication is Pregnancy Category B and is considered safe during pregnancy. It is also excreted in breast milk.
Bactrim Counseling:  I discussed with the patient the risks of sulfa antibiotics including but not limited to GI upset, allergic reaction, drug rash, diarrhea, dizziness, photosensitivity, and yeast infections.  Rarely, more serious reactions can occur including but not limited to aplastic anemia, agranulocytosis, methemoglobinemia, blood dyscrasias, liver or kidney failure, lung infiltrates or desquamative/blistering drug rashes.
Topical Clindamycin Counseling: Patient counseled that this medication may cause skin irritation or allergic reactions.  In the event of skin irritation, the patient was advised to reduce the amount of the drug applied or use it less frequently.   The patient verbalized understanding of the proper use and possible adverse effects of clindamycin.  All of the patient's questions and concerns were addressed.
Azelaic Acid Pregnancy And Lactation Text: This medication is considered safe during pregnancy and breast feeding.
Azithromycin Counseling:  I discussed with the patient the risks of azithromycin including but not limited to GI upset, allergic reaction, drug rash, diarrhea, and yeast infections.
Sarecycline Counseling: Patient advised regarding possible photosensitivity and discoloration of the teeth, skin, lips, tongue and gums.  Patient instructed to avoid sunlight, if possible.  When exposed to sunlight, patients should wear protective clothing, sunglasses, and sunscreen.  The patient was instructed to call the office immediately if the following severe adverse effects occur:  hearing changes, easy bruising/bleeding, severe headache, or vision changes.  The patient verbalized understanding of the proper use and possible adverse effects of sarecycline.  All of the patient's questions and concerns were addressed.

## 2025-06-25 NOTE — PROCEDURE: PRESCRIPTION MEDICATION MANAGEMENT
Plan: Doxycycline 100mg pill p/o BID x1 month\\nSpironolactone 50mg QD x1 week, increase to BID x3 months
Render In Strict Bullet Format?: No
Detail Level: Zone

## 2025-06-25 NOTE — PROCEDURE: ORDER TESTS
Bill For Surgical Tray: no
Billing Type: Third-Party Bill
Performing Laboratory: [id number]
Expected Date Of Service: 06/25/2025

## 2025-06-25 NOTE — HPI: ACNE (PATIENT REPORTED)
Additional Comments (Use Complete Sentences): —\\nNew pt here for acne on the face\\nUsing Azeleic acid QAM and tretinoin qhs\\nRecently breaking out for the past few months

## 2025-06-25 NOTE — HPI: HAIR LOSS
Additional History: —\\Yairso notes hair loss over the past 4-5 months\\nUsing mometasone solution once a week\\nNo improvement

## 2025-06-25 NOTE — PROCEDURE: HIGH RISK MEDICATION MONITORING
Elidel Pregnancy And Lactation Text: This medication is Pregnancy Category C. It is unknown if this medication is excreted in breast milk.
Carac Pregnancy And Lactation Text: This medication is Pregnancy Category X and contraindicated in pregnancy and in women who may become pregnant. It is unknown if this medication is excreted in breast milk.
Isotretinoin Pregnancy And Lactation Text: This medication is Pregnancy Category X and is considered extremely dangerous during pregnancy. It is unknown if it is excreted in breast milk.
Gabapentin Pregnancy And Lactation Text: This medication is Pregnancy Category C and isn't considered safe during pregnancy. It is excreted in breast milk.
Bactrim Pregnancy And Lactation Text: This medication is Pregnancy Category D and is known to cause fetal risk.  It is also excreted in breast milk.
Humira Pregnancy And Lactation Text: This medication is Pregnancy Category B and is considered safe during pregnancy. It is unknown if this medication is excreted in breast milk.
Xeljanz Counseling: I discussed with the patient the risks of Xeljanz therapy including increased risk of infection, liver issues, headache, diarrhea, or cold symptoms. Live vaccines should be avoided. They were instructed to call if they have any problems.
Thalidomide Counseling: I discussed with the patient the risks of thalidomide including but not limited to birth defects, anxiety, weakness, chest pain, dizziness, cough and severe allergy.
Griseofulvin Counseling:  I discussed with the patient the risks of griseofulvin including but not limited to photosensitivity, cytopenia, liver damage, nausea/vomiting and severe allergy.  The patient understands that this medication is best absorbed when taken with a fatty meal (e.g., ice cream or french fries).
Gabapentin Counseling: I discussed with the patient the risks of gabapentin including but not limited to dizziness, somnolence, fatigue and ataxia.
Cimetidine Counseling:  I discussed with the patient the risks of Cimetidine including but not limited to gynecomastia, headache, diarrhea, nausea, drowsiness, arrhythmias, pancreatitis, skin rashes, psychosis, bone marrow suppression and kidney toxicity.
Valtrex Counseling: I discussed with the patient the risks of valacyclovir including but not limited to kidney damage, nausea, vomiting and severe allergy.  The patient understands that if the infection seems to be worsening or is not improving, they are to call.
Erivedge Pregnancy And Lactation Text: This medication is Pregnancy Category X and is absolutely contraindicated during pregnancy. It is unknown if it is excreted in breast milk.
Hydroquinone Counseling:  Patient advised that medication may result in skin irritation, lightening (hypopigmentation), dryness, and burning.  In the event of skin irritation, the patient was advised to reduce the amount of the drug applied or use it less frequently.  Rarely, spots that are treated with hydroquinone can become darker (pseudoochronosis).  Should this occur, patient instructed to stop medication and call the office. The patient verbalized understanding of the proper use and possible adverse effects of hydroquinone.  All of the patient's questions and concerns were addressed.
Colchicine Counseling:  Patient counseled regarding adverse effects including but not limited to stomach upset (nausea, vomiting, stomach pain, or diarrhea).  Patient instructed to limit alcohol consumption while taking this medication.  Colchicine may reduce blood counts especially with prolonged use.  The patient understands that monitoring of kidney function and blood counts may be required, especially at baseline. The patient verbalized understanding of the proper use and possible adverse effects of colchicine.  All of the patient's questions and concerns were addressed.
Minoxidil Pregnancy And Lactation Text: This medication has not been assigned a Pregnancy Risk Category but animal studies failed to show danger with the topical medication. It is unknown if the medication is excreted in breast milk.
Xolair Pregnancy And Lactation Text: This medication is Pregnancy Category B and is considered safe during pregnancy. This medication is excreted in breast milk.
Imiquimod Counseling:  I discussed with the patient the risks of imiquimod including but not limited to erythema, scaling, itching, weeping, crusting, and pain.  Patient understands that the inflammatory response to imiquimod is variable from person to person and was educated regarded proper titration schedule.  If flu-like symptoms develop, patient knows to discontinue the medication and contact us.
Cellcept Pregnancy And Lactation Text: This medication is Pregnancy Category D and isn't considered safe during pregnancy. It is unknown if this medication is excreted in breast milk.
Taltz Counseling: I discussed with the patient the risks of ixekizumab including but not limited to immunosuppression, serious infections, worsening of inflammatory bowel disease and drug reactions.  The patient understands that monitoring is required including a PPD at baseline and must alert us or the primary physician if symptoms of infection or other concerning signs are noted.
Skyrizi Counseling: I discussed with the patient the risks of risankizumab-rzaa including but not limited to immunosuppression, and serious infections.  The patient understands that monitoring is required including a PPD at baseline and must alert us or the primary physician if symptoms of infection or other concerning signs are noted.
Quinolones Pregnancy And Lactation Text: This medication is Pregnancy Category C and it isn't know if it is safe during pregnancy. It is also excreted in breast milk.
Doxepin Pregnancy And Lactation Text: This medication is Pregnancy Category C and it isn't known if it is safe during pregnancy. It is also excreted in breast milk and breast feeding isn't recommended.
Topical Clindamycin Pregnancy And Lactation Text: This medication is Pregnancy Category B and is considered safe during pregnancy. It is unknown if it is excreted in breast milk.
Protopic Counseling: Patient may experience a mild burning sensation during topical application. Protopic is not approved in children less than 2 years of age. There have been case reports of hematologic and skin malignancies in patients using topical calcineurin inhibitors although causality is questionable.
High Dose Vitamin A Counseling: Side effects reviewed, pt to contact office should one occur.
Metronidazole Pregnancy And Lactation Text: This medication is Pregnancy Category B and considered safe during pregnancy.  It is also excreted in breast milk.
Dupixent Pregnancy And Lactation Text: This medication likely crosses the placenta but the risk for the fetus is uncertain. This medication is excreted in breast milk.
Infliximab Counseling:  I discussed with the patient the risks of infliximab including but not limited to myelosuppression, immunosuppression, autoimmune hepatitis, demyelinating diseases, lymphoma, and serious infections.  The patient understands that monitoring is required including a PPD at baseline and must alert us or the primary physician if symptoms of infection or other concerning signs are noted.
Xelpadmajaz Pregnancy And Lactation Text: This medication is Pregnancy Category D and is not considered safe during pregnancy.  The risk during breast feeding is also uncertain.
Azithromycin Counseling:  I discussed with the patient the risks of azithromycin including but not limited to GI upset, allergic reaction, drug rash, diarrhea, and yeast infections.
Cimzia Pregnancy And Lactation Text: This medication crosses the placenta but can be considered safe in certain situations. Cimzia may be excreted in breast milk.
Hydroxyzine Pregnancy And Lactation Text: This medication is not safe during pregnancy and should not be taken. It is also excreted in breast milk and breast feeding isn't recommended.
Spironolactone Counseling: Patient advised regarding risks of diarrhea, abdominal pain, hyperkalemia, birth defects (for female patients), liver toxicity and renal toxicity. The patient may need blood work to monitor liver and kidney function and potassium levels while on therapy. The patient verbalized understanding of the proper use and possible adverse effects of spironolactone.  All of the patient's questions and concerns were addressed.
Protopic Pregnancy And Lactation Text: This medication is Pregnancy Category C. It is unknown if this medication is excreted in breast milk when applied topically.
Tremfya Pregnancy And Lactation Text: The risk during pregnancy and breastfeeding is uncertain with this medication.
Sarecycline Counseling: Patient advised regarding possible photosensitivity and discoloration of the teeth, skin, lips, tongue and gums.  Patient instructed to avoid sunlight, if possible.  When exposed to sunlight, patients should wear protective clothing, sunglasses, and sunscreen.  The patient was instructed to call the office immediately if the following severe adverse effects occur:  hearing changes, easy bruising/bleeding, severe headache, or vision changes.  The patient verbalized understanding of the proper use and possible adverse effects of sarecycline.  All of the patient's questions and concerns were addressed.
Prednisone Counseling:  I discussed with the patient the risks of prolonged use of prednisone including but not limited to weight gain, insomnia, osteoporosis, mood changes, diabetes, susceptibility to infection, glaucoma and high blood pressure.  In cases where prednisone use is prolonged, patients should be monitored with blood pressure checks, serum glucose levels and an eye exam.  Additionally, the patient may need to be placed on GI prophylaxis, PCP prophylaxis, and calcium and vitamin D supplementation and/or a bisphosphonate.  The patient verbalized understanding of the proper use and the possible adverse effects of prednisone.  All of the patient's questions and concerns were addressed.
Hydroxychloroquine Pregnancy And Lactation Text: This medication has been shown to cause fetal harm but it isn't assigned a Pregnancy Risk Category. There are small amounts excreted in breast milk.
Include Pregnancy/Lactation Warning?: No
Carac Counseling:  I discussed with the patient the risks of Carac including but not limited to erythema, scaling, itching, weeping, crusting, and pain.
Terbinafine Counseling: Patient counseling regarding adverse effects of terbinafine including but not limited to headache, diarrhea, rash, upset stomach, liver function test abnormalities, itching, taste/smell disturbance, nausea, abdominal pain, and flatulence.  There is a rare possibility of liver failure that can occur when taking terbinafine.  The patient understands that a baseline LFT and kidney function test may be required. The patient verbalized understanding of the proper use and possible adverse effects of terbinafine.  All of the patient's questions and concerns were addressed.
Fluconazole Counseling:  Patient counseled regarding adverse effects of fluconazole including but not limited to headache, diarrhea, nausea, upset stomach, liver function test abnormalities, taste disturbance, and stomach pain.  There is a rare possibility of liver failure that can occur when taking fluconazole.  The patient understands that monitoring of LFTs and kidney function test may be required, especially at baseline. The patient verbalized understanding of the proper use and possible adverse effects of fluconazole.  All of the patient's questions and concerns were addressed.
Nsaids Pregnancy And Lactation Text: These medications are considered safe up to 30 weeks gestation. It is excreted in breast milk.
Humira Counseling:  I discussed with the patient the risks of adalimumab including but not limited to myelosuppression, immunosuppression, autoimmune hepatitis, demyelinating diseases, lymphoma, and serious infections.  The patient understands that monitoring is required including a PPD at baseline and must alert us or the primary physician if symptoms of infection or other concerning signs are noted.
Tazorac Pregnancy And Lactation Text: This medication is not safe during pregnancy. It is unknown if this medication is excreted in breast milk.
Sski Pregnancy And Lactation Text: This medication is Pregnancy Category D and isn't considered safe during pregnancy. It is excreted in breast milk.
Acitretin Counseling:  I discussed with the patient the risks of acitretin including but not limited to hair loss, dry lips/skin/eyes, liver damage, hyperlipidemia, depression/suicidal ideation, photosensitivity.  Serious rare side effects can include but are not limited to pancreatitis, pseudotumor cerebri, bony changes, clot formation/stroke/heart attack.  Patient understands that alcohol is contraindicated since it can result in liver toxicity and significantly prolong the elimination of the drug by many years.
Tetracycline Counseling: Patient counseled regarding possible photosensitivity and increased risk for sunburn.  Patient instructed to avoid sunlight, if possible.  When exposed to sunlight, patients should wear protective clothing, sunglasses, and sunscreen.  The patient was instructed to call the office immediately if the following severe adverse effects occur:  hearing changes, easy bruising/bleeding, severe headache, or vision changes.  The patient verbalized understanding of the proper use and possible adverse effects of tetracycline.  All of the patient's questions and concerns were addressed. Patient understands to avoid pregnancy while on therapy due to potential birth defects.
Dupixent Counseling: I discussed with the patient the risks of dupilumab including but not limited to eye infection and irritation, cold sores, injection site reactions, worsening of asthma, allergic reactions and increased risk of parasitic infection.  Live vaccines should be avoided while taking dupilumab. Dupilumab will also interact with certain medications such as warfarin and cyclosporine. The patient understands that monitoring is required and they must alert us or the primary physician if symptoms of infection or other concerning signs are noted.
Ivermectin Pregnancy And Lactation Text: This medication is Pregnancy Category C and it isn't known if it is safe during pregnancy. It is also excreted in breast milk.
Cephalexin Counseling: I counseled the patient regarding use of cephalexin as an antibiotic for prophylactic and/or therapeutic purposes. Cephalexin (commonly prescribed under brand name Keflex) is a cephalosporin antibiotic which is active against numerous classes of bacteria, including most skin bacteria. Side effects may include nausea, diarrhea, gastrointestinal upset, rash, hives, yeast infections, and in rare cases, hepatitis, kidney disease, seizures, fever, confusion, neurologic symptoms, and others. Patients with severe allergies to penicillin medications are cautioned that there is about a 10% incidence of cross-reactivity with cephalosporins. When possible, patients with penicillin allergies should use alternatives to cephalosporins for antibiotic therapy.
Azithromycin Pregnancy And Lactation Text: This medication is considered safe during pregnancy and is also secreted in breast milk.
Doxycycline Counseling:  Patient counseled regarding possible photosensitivity and increased risk for sunburn.  Patient instructed to avoid sunlight, if possible.  When exposed to sunlight, patients should wear protective clothing, sunglasses, and sunscreen.  The patient was instructed to call the office immediately if the following severe adverse effects occur:  hearing changes, easy bruising/bleeding, severe headache, or vision changes.  The patient verbalized understanding of the proper use and possible adverse effects of doxycycline.  All of the patient's questions and concerns were addressed.
Ketoconazole Counseling:   Patient counseled regarding improving absorption with orange juice.  Adverse effects include but are not limited to breast enlargement, headache, diarrhea, nausea, upset stomach, liver function test abnormalities, taste disturbance, and stomach pain.  There is a rare possibility of liver failure that can occur when taking ketoconazole. The patient understands that monitoring of LFTs may be required, especially at baseline. The patient verbalized understanding of the proper use and possible adverse effects of ketoconazole.  All of the patient's questions and concerns were addressed.
Simponi Counseling:  I discussed with the patient the risks of golimumab including but not limited to myelosuppression, immunosuppression, autoimmune hepatitis, demyelinating diseases, lymphoma, and serious infections.  The patient understands that monitoring is required including a PPD at baseline and must alert us or the primary physician if symptoms of infection or other concerning signs are noted.
Birth Control Pills Counseling: Birth Control Pill Counseling: I discussed with the patient the potential side effects of OCPs including but not limited to increased risk of stroke, heart attack, thrombophlebitis, deep venous thrombosis, hepatic adenomas, breast changes, GI upset, headaches, and depression.  The patient verbalized understanding of the proper use and possible adverse effects of OCPs. All of the patient's questions and concerns were addressed.
Eucrisa Counseling: Patient may experience a mild burning sensation during topical application. Eucrisa is not approved in children less than 2 years of age.
Cimetidine Pregnancy And Lactation Text: This medication is Pregnancy Category B and is considered safe during pregnancy. It is also excreted in breast milk and breast feeding isn't recommended.
Solaraze Counseling:  I discussed with the patient the risks of Solaraze including but not limited to erythema, scaling, itching, weeping, crusting, and pain.
Hydroxyzine Counseling: Patient advised that the medication is sedating and not to drive a car after taking this medication.  Patient informed of potential adverse effects including but not limited to dry mouth, urinary retention, and blurry vision.  The patient verbalized understanding of the proper use and possible adverse effects of hydroxyzine.  All of the patient's questions and concerns were addressed.
Topical Sulfur Applications Counseling: Topical Sulfur Counseling: Patient counseled that this medication may cause skin irritation or allergic reactions.  In the event of skin irritation, the patient was advised to reduce the amount of the drug applied or use it less frequently.   The patient verbalized understanding of the proper use and possible adverse effects of topical sulfur application.  All of the patient's questions and concerns were addressed.
Clofazimine Counseling:  I discussed with the patient the risks of clofazimine including but not limited to skin and eye pigmentation, liver damage, nausea/vomiting, gastrointestinal bleeding and allergy.
Rituxan Pregnancy And Lactation Text: This medication is Pregnancy Category C and it isn't know if it is safe during pregnancy. It is unknown if this medication is excreted in breast milk but similar antibodies are known to be excreted.
Tremfya Counseling: I discussed with the patient the risks of guselkumab including but not limited to immunosuppression, serious infections, and drug reactions.  The patient understands that monitoring is required including a PPD at baseline and must alert us or the primary physician if symptoms of infection or other concerning signs are noted.
Cyclosporine Counseling:  I discussed with the patient the risks of cyclosporine including but not limited to hypertension, gingival hyperplasia,myelosuppression, immunosuppression, liver damage, kidney damage, neurotoxicity, lymphoma, and serious infections. The patient understands that monitoring is required including baseline blood pressure, CBC, CMP, lipid panel and uric acid, and then 1-2 times monthly CMP and blood pressure.
Otezla Counseling: The side effects of Otezla were discussed with the patient, including but not limited to worsening or new depression, weight loss, diarrhea, nausea, upper respiratory tract infection, and headache. Patient instructed to call the office should any adverse effect occur.  The patient verbalized understanding of the proper use and possible adverse effects of Otezla.  All the patient's questions and concerns were addressed.
Enbrel Counseling:  I discussed with the patient the risks of etanercept including but not limited to myelosuppression, immunosuppression, autoimmune hepatitis, demyelinating diseases, lymphoma, and infections.  The patient understands that monitoring is required including a PPD at baseline and must alert us or the primary physician if symptoms of infection or other concerning signs are noted.
Ivermectin Counseling:  Patient instructed to take medication on an empty stomach with a full glass of water.  Patient informed of potential adverse effects including but not limited to nausea, diarrhea, dizziness, itching, and swelling of the extremities or lymph nodes.  The patient verbalized understanding of the proper use and possible adverse effects of ivermectin.  All of the patient's questions and concerns were addressed.
Bexarotene Pregnancy And Lactation Text: This medication is Pregnancy Category X and should not be given to women who are pregnant or may become pregnant. This medication should not be used if you are breast feeding.
Hydroxychloroquine Counseling:  I discussed with the patient that a baseline ophthalmologic exam is needed at the start of therapy and every year thereafter while on therapy. A CBC may also be warranted for monitoring.  The side effects of this medication were discussed with the patient, including but not limited to agranulocytosis, aplastic anemia, seizures, rashes, retinopathy, and liver toxicity. Patient instructed to call the office should any adverse effect occur.  The patient verbalized understanding of the proper use and possible adverse effects of Plaquenil.  All the patient's questions and concerns were addressed.
Glycopyrrolate Pregnancy And Lactation Text: This medication is Pregnancy Category B and is considered safe during pregnancy. It is unknown if it is excreted breast milk.
High Dose Vitamin A Pregnancy And Lactation Text: High dose vitamin A therapy is contraindicated during pregnancy and breast feeding.
Stelara Counseling:  I discussed with the patient the risks of ustekinumab including but not limited to immunosuppression, malignancy, posterior leukoencephalopathy syndrome, and serious infections.  The patient understands that monitoring is required including a PPD at baseline and must alert us or the primary physician if symptoms of infection or other concerning signs are noted.
Cosentyx Counseling:  I discussed with the patient the risks of Cosentyx including but not limited to worsening of Crohn's disease, immunosuppression, allergic reactions and infections.  The patient understands that monitoring is required including a PPD at baseline and must alert us or the primary physician if symptoms of infection or other concerning signs are noted.
Minocycline Counseling: Patient advised regarding possible photosensitivity and discoloration of the teeth, skin, lips, tongue and gums.  Patient instructed to avoid sunlight, if possible.  When exposed to sunlight, patients should wear protective clothing, sunglasses, and sunscreen.  The patient was instructed to call the office immediately if the following severe adverse effects occur:  hearing changes, easy bruising/bleeding, severe headache, or vision changes.  The patient verbalized understanding of the proper use and possible adverse effects of minocycline.  All of the patient's questions and concerns were addressed.
Doxepin Counseling:  Patient advised that the medication is sedating and not to drive a car after taking this medication. Patient informed of potential adverse effects including but not limited to dry mouth, urinary retention, and blurry vision.  The patient verbalized understanding of the proper use and possible adverse effects of doxepin.  All of the patient's questions and concerns were addressed.
Detail Level: Detailed
Methotrexate Pregnancy And Lactation Text: This medication is Pregnancy Category X and is known to cause fetal harm. This medication is excreted in breast milk.
Xolair Counseling:  Patient informed of potential adverse effects including but not limited to fever, muscle aches, rash and allergic reactions.  The patient verbalized understanding of the proper use and possible adverse effects of Xolair.  All of the patient's questions and concerns were addressed.
Quinolones Counseling:  I discussed with the patient the risks of fluoroquinolones including but not limited to GI upset, allergic reaction, drug rash, diarrhea, dizziness, photosensitivity, yeast infections, liver function test abnormalities, tendonitis/tendon rupture.
Minocycline Pregnancy And Lactation Text: This medication is Pregnancy Category D and not consider safe during pregnancy. It is also excreted in breast milk.
Drysol Pregnancy And Lactation Text: This medication is considered safe during pregnancy and breast feeding.
Benzoyl Peroxide Pregnancy And Lactation Text: This medication is Pregnancy Category C. It is unknown if benzoyl peroxide is excreted in breast milk.
Valtrex Pregnancy And Lactation Text: this medication is Pregnancy Category B and is considered safe during pregnancy. This medication is not directly found in breast milk but it's metabolite acyclovir is present.
Odomzo Counseling- I discussed with the patient the risks of Odomzo including but not limited to nausea, vomiting, diarrhea, constipation, weight loss, changes in the sense of taste, decreased appetite, muscle spasms, and hair loss.  The patient verbalized understanding of the proper use and possible adverse effects of Odomzo.  All of the patient's questions and concerns were addressed.
Tazorac Counseling:  Patient advised that medication is irritating and drying.  Patient may need to apply sparingly and wash off after an hour before eventually leaving it on overnight.  The patient verbalized understanding of the proper use and possible adverse effects of tazorac.  All of the patient's questions and concerns were addressed.
SSKI Counseling:  I discussed with the patient the risks of SSKI including but not limited to thyroid abnormalities, metallic taste, GI upset, fever, headache, acne, arthralgias, paraesthesias, lymphadenopathy, easy bleeding, arrhythmias, and allergic reaction.
Isotretinoin Counseling: Patient should get monthly blood tests, not donate blood, not drive at night if vision affected, not share medication, and not undergo elective surgery for 6 months after tx completed. Side effects reviewed, pt to contact office should one occur.
Clindamycin Pregnancy And Lactation Text: This medication can be used in pregnancy if certain situations. Clindamycin is also present in breast milk.
Otezla Pregnancy And Lactation Text: This medication is Pregnancy Category C and it isn't known if it is safe during pregnancy. It is unknown if it is excreted in breast milk.
Erivedge Counseling- I discussed with the patient the risks of Erivedge including but not limited to nausea, vomiting, diarrhea, constipation, weight loss, changes in the sense of taste, decreased appetite, muscle spasms, and hair loss.  The patient verbalized understanding of the proper use and possible adverse effects of Erivedge.  All of the patient's questions and concerns were addressed.
Dapsone Counseling: I discussed with the patient the risks of dapsone including but not limited to hemolytic anemia, agranulocytosis, rashes, methemoglobinemia, kidney failure, peripheral neuropathy, headaches, GI upset, and liver toxicity.  Patients who start dapsone require monitoring including baseline LFTs and weekly CBCs for the first month, then every month thereafter.  The patient verbalized understanding of the proper use and possible adverse effects of dapsone.  All of the patient's questions and concerns were addressed.
Zyclara Counseling:  I discussed with the patient the risks of imiquimod including but not limited to erythema, scaling, itching, weeping, crusting, and pain.  Patient understands that the inflammatory response to imiquimod is variable from person to person and was educated regarded proper titration schedule.  If flu-like symptoms develop, patient knows to discontinue the medication and contact us.
Itraconazole Counseling:  I discussed with the patient the risks of itraconazole including but not limited to liver damage, nausea/vomiting, neuropathy, and severe allergy.  The patient understands that this medication is best absorbed when taken with acidic beverages such as non-diet cola or ginger ale.  The patient understands that monitoring is required including baseline LFTs and repeat LFTs at intervals.  The patient understands that they are to contact us or the primary physician if concerning signs are noted.
Drysol Counseling:  I discussed with the patient the risks of drysol/aluminum chloride including but not limited to skin rash, itching, irritation, burning.
Cyclophosphamide Pregnancy And Lactation Text: This medication is Pregnancy Category D and it isn't considered safe during pregnancy. This medication is excreted in breast milk.
Rifampin Pregnancy And Lactation Text: This medication is Pregnancy Category C and it isn't know if it is safe during pregnancy. It is also excreted in breast milk and should not be used if you are breast feeding.
Minoxidil Counseling: Minoxidil is a topical medication which can increase blood flow where it is applied. It is uncertain how this medication increases hair growth. Side effects are uncommon and include stinging and allergic reactions.
Rifampin Counseling: I discussed with the patient the risks of rifampin including but not limited to liver damage, kidney damage, red-orange body fluids, nausea/vomiting and severe allergy.
Benzoyl Peroxide Counseling: Patient counseled that medicine may cause skin irritation and bleach clothing.  In the event of skin irritation, the patient was advised to reduce the amount of the drug applied or use it less frequently.   The patient verbalized understanding of the proper use and possible adverse effects of benzoyl peroxide.  All of the patient's questions and concerns were addressed.
Glycopyrrolate Counseling:  I discussed with the patient the risks of glycopyrrolate including but not limited to skin rash, drowsiness, dry mouth, difficulty urinating, and blurred vision.
Dapsone Pregnancy And Lactation Text: This medication is Pregnancy Category C and is not considered safe during pregnancy or breast feeding.
5-Fu Counseling: 5-Fluorouracil Counseling:  I discussed with the patient the risks of 5-fluorouracil including but not limited to erythema, scaling, itching, weeping, crusting, and pain.
Topical Clindamycin Counseling: Patient counseled that this medication may cause skin irritation or allergic reactions.  In the event of skin irritation, the patient was advised to reduce the amount of the drug applied or use it less frequently.   The patient verbalized understanding of the proper use and possible adverse effects of clindamycin.  All of the patient's questions and concerns were addressed.
Siliq Counseling:  I discussed with the patient the risks of Siliq including but not limited to new or worsening depression, suicidal thoughts and behavior, immunosuppression, malignancy, posterior leukoencephalopathy syndrome, and serious infections.  The patient understands that monitoring is required including a PPD at baseline and must alert us or the primary physician if symptoms of infection or other concerning signs are noted. There is also a special program designed to monitor depression which is required with Siliq.
Ilumya Counseling: I discussed with the patient the risks of tildrakizumab including but not limited to immunosuppression, malignancy, posterior leukoencephalopathy syndrome, and serious infections.  The patient understands that monitoring is required including a PPD at baseline and must alert us or the primary physician if symptoms of infection or other concerning signs are noted.
Elidel Counseling: Patient may experience a mild burning sensation during topical application. Elidel is not approved in children less than 2 years of age. There have been case reports of hematologic and skin malignancies in patients using topical calcineurin inhibitors although causality is questionable.
Oxybutynin Counseling:  I discussed with the patient the risks of oxybutynin including but not limited to skin rash, drowsiness, dry mouth, difficulty urinating, and blurred vision.
Erythromycin Pregnancy And Lactation Text: This medication is Pregnancy Category B and is considered safe during pregnancy. It is also excreted in breast milk.
Nsaids Counseling: NSAID Counseling: I discussed with the patient that NSAIDs should be taken with food. Prolonged use of NSAIDs can result in the development of stomach ulcers.  Patient advised to stop taking NSAIDs if abdominal pain occurs.  The patient verbalized understanding of the proper use and possible adverse effects of NSAIDs.  All of the patient's questions and concerns were addressed.
Erythromycin Counseling:  I discussed with the patient the risks of erythromycin including but not limited to GI upset, allergic reaction, drug rash, diarrhea, increase in liver enzymes, and yeast infections.
Birth Control Pills Pregnancy And Lactation Text: This medication should be avoided if pregnant and for the first 30 days post-partum.
Metronidazole Counseling:  I discussed with the patient the risks of metronidazole including but not limited to seizures, nausea/vomiting, a metallic taste in the mouth, nausea/vomiting and severe allergy.
Griseofulvin Pregnancy And Lactation Text: This medication is Pregnancy Category X and is known to cause serious birth defects. It is unknown if this medication is excreted in breast milk but breast feeding should be avoided.
Cephalexin Pregnancy And Lactation Text: This medication is Pregnancy Category B and considered safe during pregnancy.  It is also excreted in breast milk but can be used safely for shorter doses.
Cimzia Counseling:  I discussed with the patient the risks of Cimzia including but not limited to immunosuppression, allergic reactions and infections.  The patient understands that monitoring is required including a PPD at baseline and must alert us or the primary physician if symptoms of infection or other concerning signs are noted.
Topical Retinoid counseling:  Patient advised to apply a pea-sized amount only at bedtime and wait 30 minutes after washing their face before applying.  If too drying, patient may add a non-comedogenic moisturizer. The patient verbalized understanding of the proper use and possible adverse effects of retinoids.  All of the patient's questions and concerns were addressed.
Picato Counseling:  I discussed with the patient the risks of Picato including but not limited to erythema, scaling, itching, weeping, crusting, and pain.
Azathioprine Counseling:  I discussed with the patient the risks of azathioprine including but not limited to myelosuppression, immunosuppression, hepatotoxicity, lymphoma, and infections.  The patient understands that monitoring is required including baseline LFTs, Creatinine, possible TPMP genotyping and weekly CBCs for the first month and then every 2 weeks thereafter.  The patient verbalized understanding of the proper use and possible adverse effects of azathioprine.  All of the patient's questions and concerns were addressed.
Ketoconazole Pregnancy And Lactation Text: This medication is Pregnancy Category C and it isn't know if it is safe during pregnancy. It is also excreted in breast milk and breast feeding isn't recommended.
Bexarotene Counseling:  I discussed with the patient the risks of bexarotene including but not limited to hair loss, dry lips/skin/eyes, liver abnormalities, hyperlipidemia, pancreatitis, depression/suicidal ideation, photosensitivity, drug rash/allergic reactions, hypothyroidism, anemia, leukopenia, infection, cataracts, and teratogenicity.  Patient understands that they will need regular blood tests to check lipid profile, liver function tests, white blood cell count, thyroid function tests and pregnancy test if applicable.
Clindamycin Counseling: I counseled the patient regarding use of clindamycin as an antibiotic for prophylactic and/or therapeutic purposes. Clindamycin is active against numerous classes of bacteria, including skin bacteria. Side effects may include nausea, diarrhea, gastrointestinal upset, rash, hives, yeast infections, and in rare cases, colitis.
Albendazole Counseling:  I discussed with the patient the risks of albendazole including but not limited to cytopenia, kidney damage, nausea/vomiting and severe allergy.  The patient understands that this medication is being used in an off-label manner.
Methotrexate Counseling:  Patient counseled regarding adverse effects of methotrexate including but not limited to nausea, vomiting, abnormalities in liver function tests. Patients may develop mouth sores, rash, diarrhea, and abnormalities in blood counts. The patient understands that monitoring is required including LFT's and blood counts.  There is a rare possibility of scarring of the liver and lung problems that can occur when taking methotrexate. Persistent nausea, loss of appetite, pale stools, dark urine, cough, and shortness of breath should be reported immediately. Patient advised to discontinue methotrexate treatment at least three months before attempting to become pregnant.  I discussed the need for folate supplements while taking methotrexate.  These supplements can decrease side effects during methotrexate treatment. The patient verbalized understanding of the proper use and possible adverse effects of methotrexate.  All of the patient's questions and concerns were addressed.
Acitretin Pregnancy And Lactation Text: This medication is Pregnancy Category X and should not be given to women who are pregnant or may become pregnant in the future. This medication is excreted in breast milk.
Cellcept Counseling:  I discussed with the patient the risks of mycophenolate mofetil including but not limited to infection/immunosuppression, GI upset, hypokalemia, hypercholesterolemia, bone marrow suppression, lymphoproliferative disorders, malignancy, GI ulceration/bleed/perforation, colitis, interstitial lung disease, kidney failure, progressive multifocal leukoencephalopathy, and birth defects.  The patient understands that monitoring is required including a baseline creatinine and regular CBC testing. In addition, patient must alert us immediately if symptoms of infection or other concerning signs are noted.
Solaraze Pregnancy And Lactation Text: This medication is Pregnancy Category B and is considered safe. There is some data to suggest avoiding during the third trimester. It is unknown if this medication is excreted in breast milk.
Bactrim Counseling:  I discussed with the patient the risks of sulfa antibiotics including but not limited to GI upset, allergic reaction, drug rash, diarrhea, dizziness, photosensitivity, and yeast infections.  Rarely, more serious reactions can occur including but not limited to aplastic anemia, agranulocytosis, methemoglobinemia, blood dyscrasias, liver or kidney failure, lung infiltrates or desquamative/blistering drug rashes.
Cyclosporine Pregnancy And Lactation Text: This medication is Pregnancy Category C and it isn't know if it is safe during pregnancy. This medication is excreted in breast milk.
Arava Counseling:  Patient counseled regarding adverse effects of Arava including but not limited to nausea, vomiting, abnormalities in liver function tests. Patients may develop mouth sores, rash, diarrhea, and abnormalities in blood counts. The patient understands that monitoring is required including LFTs and blood counts.  There is a rare possibility of scarring of the liver and lung problems that can occur when taking methotrexate. Persistent nausea, loss of appetite, pale stools, dark urine, cough, and shortness of breath should be reported immediately. Patient advised to discontinue Arava treatment and consult with a physician prior to attempting conception. The patient will have to undergo a treatment to eliminate Arava from the body prior to conception.
Cyclophosphamide Counseling:  I discussed with the patient the risks of cyclophosphamide including but not limited to hair loss, hormonal abnormalities, decreased fertility, abdominal pain, diarrhea, nausea and vomiting, bone marrow suppression and infection. The patient understands that monitoring is required while taking this medication.
Doxycycline Pregnancy And Lactation Text: This medication is Pregnancy Category D and not consider safe during pregnancy. It is also excreted in breast milk but is considered safe for shorter treatment courses.
Rituxan Counseling:  I discussed with the patient the risks of Rituxan infusions. Side effects can include infusion reactions, severe drug rashes including mucocutaneous reactions, reactivation of latent hepatitis and other infections and rarely progressive multifocal leukoencephalopathy.  All of the patient's questions and concerns were addressed.
Spironolactone Pregnancy And Lactation Text: This medication can cause feminization of the male fetus and should be avoided during pregnancy. The active metabolite is also found in breast milk.
Topical Sulfur Applications Pregnancy And Lactation Text: This medication is Pregnancy Category C and has an unknown safety profile during pregnancy. It is unknown if this topical medication is excreted in breast milk.